# Patient Record
Sex: FEMALE | Race: WHITE
[De-identification: names, ages, dates, MRNs, and addresses within clinical notes are randomized per-mention and may not be internally consistent; named-entity substitution may affect disease eponyms.]

---

## 2018-10-24 ENCOUNTER — HOSPITAL ENCOUNTER (EMERGENCY)
Dept: HOSPITAL 92 - ERS | Age: 53
Discharge: HOME | End: 2018-10-24
Payer: MEDICARE
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
## 2018-10-24 DIAGNOSIS — F41.9: ICD-10-CM

## 2018-10-24 DIAGNOSIS — E03.9: ICD-10-CM

## 2018-10-24 DIAGNOSIS — S80.11XA: Primary | ICD-10-CM

## 2018-10-24 PROCEDURE — 96372 THER/PROPH/DIAG INJ SC/IM: CPT

## 2018-10-24 NOTE — RAD
THREE VIEWS RIGHT ANKLE:

 

Date: 10-24-18 

 

History: Right ankle pain/leg pain. Patient had 250 lbs. box fall over on right ankle. 

 

FINDINGS: 

The ankle mortise is congruent. There is no evidence of a fracture, dislocation, or other osseous abn
ormality involving the right ankle. Round osseous density is seen overlying the region of the anterio
r calcaneus in the region of the navicular bone, probably related to an accessory center of ossificat
ion. 

 

IMPRESSION: 

No acute osseous abnormality. 

 

POS: [facility]

## 2018-10-24 NOTE — RAD
TWO VIEWS RIGHT TIBIA AND FIBULA:

 

Date: 10-24-18 

 

History: Right leg pain. A 250 lbs box fell over on patient's right lower extremity. 

 

FINDINGS: 

There is no evidence of fracture, dislocation, or other osseous abnormality involving the right tibia
/fibula. 

 

IMPRESSION: 

No acute osseous abnormality. 

 

POS: [facility]

## 2020-04-04 ENCOUNTER — HOSPITAL ENCOUNTER (EMERGENCY)
Dept: HOSPITAL 92 - ERS | Age: 55
Discharge: HOME | End: 2020-04-04
Payer: COMMERCIAL

## 2020-04-04 DIAGNOSIS — J06.9: Primary | ICD-10-CM

## 2020-04-04 DIAGNOSIS — E03.9: ICD-10-CM

## 2020-04-04 DIAGNOSIS — F41.9: ICD-10-CM

## 2020-04-04 PROCEDURE — 99283 EMERGENCY DEPT VISIT LOW MDM: CPT

## 2020-04-21 ENCOUNTER — HOSPITAL ENCOUNTER (INPATIENT)
Dept: HOSPITAL 92 - ERS | Age: 55
LOS: 9 days | Discharge: HOME | DRG: 330 | End: 2020-04-30
Attending: SURGERY | Admitting: SURGERY
Payer: MEDICARE

## 2020-04-21 VITALS — BODY MASS INDEX: 25.4 KG/M2

## 2020-04-21 DIAGNOSIS — K31.1: ICD-10-CM

## 2020-04-21 DIAGNOSIS — E83.39: ICD-10-CM

## 2020-04-21 DIAGNOSIS — E03.9: ICD-10-CM

## 2020-04-21 DIAGNOSIS — K90.0: ICD-10-CM

## 2020-04-21 DIAGNOSIS — K26.5: Primary | ICD-10-CM

## 2020-04-21 DIAGNOSIS — E87.6: ICD-10-CM

## 2020-04-21 DIAGNOSIS — F41.9: ICD-10-CM

## 2020-04-21 DIAGNOSIS — R09.89: ICD-10-CM

## 2020-04-21 DIAGNOSIS — F32.9: ICD-10-CM

## 2020-04-21 DIAGNOSIS — Z79.890: ICD-10-CM

## 2020-04-21 DIAGNOSIS — E83.42: ICD-10-CM

## 2020-04-21 DIAGNOSIS — Z20.828: ICD-10-CM

## 2020-04-21 DIAGNOSIS — D62: ICD-10-CM

## 2020-04-21 DIAGNOSIS — Z79.899: ICD-10-CM

## 2020-04-21 LAB
ALBUMIN SERPL BCG-MCNC: 4.5 G/DL (ref 3.5–5)
ALP SERPL-CCNC: 110 U/L (ref 40–110)
ALT SERPL W P-5'-P-CCNC: 10 U/L (ref 8–55)
ANION GAP SERPL CALC-SCNC: 18 MMOL/L (ref 10–20)
AST SERPL-CCNC: 16 U/L (ref 5–34)
BASOPHILS # BLD AUTO: 0.1 THOU/UL (ref 0–0.2)
BASOPHILS NFR BLD AUTO: 0.5 % (ref 0–1)
BILIRUB SERPL-MCNC: 0.5 MG/DL (ref 0.2–1.2)
BUN SERPL-MCNC: 16 MG/DL (ref 9.8–20.1)
CALCIUM SERPL-MCNC: 10.2 MG/DL (ref 7.8–10.44)
CHLORIDE SERPL-SCNC: 92 MMOL/L (ref 98–107)
CO2 SERPL-SCNC: 33 MMOL/L (ref 22–29)
CREAT CL PREDICTED SERPL C-G-VRATE: 0 ML/MIN (ref 70–130)
EOSINOPHIL # BLD AUTO: 0 THOU/UL (ref 0–0.7)
EOSINOPHIL NFR BLD AUTO: 0.5 % (ref 0–10)
GLOBULIN SER CALC-MCNC: 3.8 G/DL (ref 2.4–3.5)
GLUCOSE SERPL-MCNC: 141 MG/DL (ref 70–105)
HGB BLD-MCNC: 15.3 G/DL (ref 12–16)
LYMPHOCYTES # BLD: 1.7 THOU/UL (ref 1.2–3.4)
LYMPHOCYTES NFR BLD AUTO: 16.1 % (ref 21–51)
MCH RBC QN AUTO: 29.2 PG (ref 27–31)
MCV RBC AUTO: 92.6 FL (ref 78–98)
MONOCYTES # BLD AUTO: 0.6 THOU/UL (ref 0.11–0.59)
MONOCYTES NFR BLD AUTO: 5.8 % (ref 0–10)
NEUTROPHILS # BLD AUTO: 8.3 THOU/UL (ref 1.4–6.5)
NEUTROPHILS NFR BLD AUTO: 77.1 % (ref 42–75)
PLATELET # BLD AUTO: 349 THOU/UL (ref 130–400)
POTASSIUM SERPL-SCNC: 3.2 MMOL/L (ref 3.5–5.1)
PREGS CONTROL BACKGROUND?: (no result)
PREGS CONTROL BAR APPEAR?: YES
PROT UR STRIP.AUTO-MCNC: 300 MG/DL
RBC # BLD AUTO: 5.23 MILL/UL (ref 4.2–5.4)
RBC UR QL AUTO: (no result) HPF (ref 0–3)
SODIUM SERPL-SCNC: 140 MMOL/L (ref 136–145)
WBC # BLD AUTO: 10.8 THOU/UL (ref 4.8–10.8)
WBC UR QL AUTO: (no result) HPF (ref 0–3)

## 2020-04-21 PROCEDURE — 96361 HYDRATE IV INFUSION ADD-ON: CPT

## 2020-04-21 PROCEDURE — 36415 COLL VENOUS BLD VENIPUNCTURE: CPT

## 2020-04-21 PROCEDURE — 84100 ASSAY OF PHOSPHORUS: CPT

## 2020-04-21 PROCEDURE — 85007 BL SMEAR W/DIFF WBC COUNT: CPT

## 2020-04-21 PROCEDURE — 74240 X-RAY XM UPR GI TRC 1CNTRST: CPT

## 2020-04-21 PROCEDURE — 83605 ASSAY OF LACTIC ACID: CPT

## 2020-04-21 PROCEDURE — 81003 URINALYSIS AUTO W/O SCOPE: CPT

## 2020-04-21 PROCEDURE — 0DB90ZX EXCISION OF DUODENUM, OPEN APPROACH, DIAGNOSTIC: ICD-10-PCS | Performed by: SURGERY

## 2020-04-21 PROCEDURE — 83735 ASSAY OF MAGNESIUM: CPT

## 2020-04-21 PROCEDURE — 88312 SPECIAL STAINS GROUP 1: CPT

## 2020-04-21 PROCEDURE — 96367 TX/PROPH/DG ADDL SEQ IV INF: CPT

## 2020-04-21 PROCEDURE — 85027 COMPLETE CBC AUTOMATED: CPT

## 2020-04-21 PROCEDURE — 84703 CHORIONIC GONADOTROPIN ASSAY: CPT

## 2020-04-21 PROCEDURE — 96375 TX/PRO/DX INJ NEW DRUG ADDON: CPT

## 2020-04-21 PROCEDURE — 80048 BASIC METABOLIC PNL TOTAL CA: CPT

## 2020-04-21 PROCEDURE — 88305 TISSUE EXAM BY PATHOLOGIST: CPT

## 2020-04-21 PROCEDURE — 96376 TX/PRO/DX INJ SAME DRUG ADON: CPT

## 2020-04-21 PROCEDURE — 87040 BLOOD CULTURE FOR BACTERIA: CPT

## 2020-04-21 PROCEDURE — 96365 THER/PROPH/DIAG IV INF INIT: CPT

## 2020-04-21 PROCEDURE — 74177 CT ABD & PELVIS W/CONTRAST: CPT

## 2020-04-21 PROCEDURE — 81015 MICROSCOPIC EXAM OF URINE: CPT

## 2020-04-21 PROCEDURE — 71045 X-RAY EXAM CHEST 1 VIEW: CPT

## 2020-04-21 PROCEDURE — 51701 INSERT BLADDER CATHETER: CPT

## 2020-04-21 PROCEDURE — 87635 SARS-COV-2 COVID-19 AMP PRB: CPT

## 2020-04-21 PROCEDURE — 93005 ELECTROCARDIOGRAM TRACING: CPT

## 2020-04-21 PROCEDURE — 0DU907Z SUPPLEMENT DUODENUM WITH AUTOLOGOUS TISSUE SUBSTITUTE, OPEN APPROACH: ICD-10-PCS | Performed by: SURGERY

## 2020-04-21 PROCEDURE — 94640 AIRWAY INHALATION TREATMENT: CPT

## 2020-04-21 PROCEDURE — 87086 URINE CULTURE/COLONY COUNT: CPT

## 2020-04-21 PROCEDURE — 8E0ZXY6 ISOLATION: ICD-10-PCS | Performed by: SURGERY

## 2020-04-21 PROCEDURE — C9113 INJ PANTOPRAZOLE SODIUM, VIA: HCPCS

## 2020-04-21 PROCEDURE — U0002 COVID-19 LAB TEST NON-CDC: HCPCS

## 2020-04-21 PROCEDURE — 80053 COMPREHEN METABOLIC PANEL: CPT

## 2020-04-21 PROCEDURE — 85025 COMPLETE CBC W/AUTO DIFF WBC: CPT

## 2020-04-21 NOTE — OP
DATE OF PROCEDURE:  04/21/2020



PREOPERATIVE DIAGNOSIS:  Perforated viscus.



POSTOPERATIVE DIAGNOSIS:  Perforated duodenal ulcer.



PROCEDURES PERFORMED:  Exploratory laparotomy, abdominal washout, closure and

omental patch repair of perforated duodenal ulcer. 



ANESTHESIA:  General.



ESTIMATED BLOOD LOSS:  50 mL.



COMPLICATIONS:  None.



FINDINGS:  First portion anterior perforation duodenal ulcer just past the pylorus.

Biopsy taken. 



TECHNIQUE:  The patient was taken to the operating room and laid supine on the

operating room table.  After general anesthetic was obtained, a Simms was placed.

The abdomen was prepped and draped in a sterile fashion.  A midline incision was

made in the upper midline.  Cautery dissected down to and into the abdominal cavity.

 Bookwalter retractor was placed.  Significant amount of intraabdominal

contamination was found.  There was a first portion of duodenal ulcer.  There was a

lot of bile in the upper abdomen.  This was all irrigated out.  Biopsy was performed

of the ulcer wall and then it was closed using PDS transversely.  NG tube was felt

to be in good position in the mid body of stomach.  A tongue of omentum was brought

up and laid over the top of the repair and sewn around using silk pop-off sutures.

This completely covers the perforation closure.  The abdomen had been irrigated

copiously using multiple liters of 

warm sterile saline until returns were clear.  All instrument counts, needle counts,

lap counts were correct.  #1 PDS was used to close the fascia from the top and

bottom and tied in the middle.  Subcutaneous tissues were irrigated and closed using

skin staples.  Telfa helen were placed in between the staples.  The patient is en

route to Recovery in stable condition.  All instrument counts, needle counts, and

lap counts were correct. 





Job ID:  [id number]

## 2020-04-21 NOTE — RAD
PORTABLE CHEST ONE VIEW:

4/21/20 at 4:39 p.m.

 

HISTORY:  

Vomiting, abdominal pain.

 

FINDINGS: 

The heart size is normal. The lungs are expanded without lobar consolidation,  pneumothoraces or pleu
ral effusions. There are postop changes with metallic hardware in the lower cervical spine. 

 

IMPRESSION: 

No acute process. 

 

POS: [facility]

## 2020-04-21 NOTE — HP
CHIEF COMPLAINT:  Abdominal pain.



HISTORY OF PRESENT ILLNESS:  This is a 54-year-old female, who presents with 
severe

upper abdominal pain, that has been building over the last 48 hours, seen in

emergency department where a CT scan reveals free intraperitoneal air.  She has 
no

known history of peptic ulcer or diverticulitis.  There is inflammatory change

around the duodenum on her CT scan, but she also has diverticula around her 
sigmoid

colon.  She had upper endoscopy by Dr. Martin in 2019, but she denies known 
history of

ulcer.  She was just recently diagnosed with celiac.  Her pain is described as

sharp, does not stop, goes straight to her back.  Denies history of heart 
disease.

She sees Dr. 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

vessels, but he has elected to treat it without intervention. 



PAST MEDICAL HISTORY:  Above.



PAST SURGICAL HISTORY:  Supraumbilical hernia repair.



MEDICATINOS:  Medicines taken daily estradiol.



SOCIAL HISTORY:  No smoking, alcohol, or other drugs.  No NSAID use or abuse.



REVIEW OF SYSTEMS:  Ten-system review of systems is otherwise negative except as

described above. 



PHYSICAL EXAMINATION:

VITAL SIGNS:  Pulse is 83, blood pressure is 130/75, respirations are 12, she is

afebrile. 

HEENT:  Sclerae anicteric.  Oropharynx clear. 

NECK:  No lymphadenopathy. 

CHEST:  Clear. 

HEART:  Regular rate. 

ABDOMEN:  Soft with diffuse peritoneal signs.  Well-healed incision above the

umbilicus. 

EXTREMITIES:  No ischemia or edema to extremities.



LABORATORY DATA:  White blood cell count is normal.  Electrolytes and creatinine

normal. 



IMAGING STUDIES:  CT scan as above.



ASSESSMENT:  

1. Perforated viscus with moderate free air, needs urgent laparotomy.

2. Upper respiratory symptoms 2 weeks ago.  They did order a COVID test today, 
but

she is not suspected of having the disease. 



PLAN:  To the operating room for exploration.



45 minutes spent in direct counseling, exam, reviewing films and discussion.



Job ID:  [id number]



[facility]

## 2020-04-21 NOTE — CT
CT OF THE ABDOMEN AND PELVIS WITH CONTRAST:

4/21/20

 

COMPARISON: 

3/2/17. 

 

HISTORY:  

History of celiac disease. Abdominal pain. Patient has been sick for three weeks. 

 

TECHNIQUE:  

Multiple contiguous axial images were obtained in a CT of the abdomen and pelvis with contrast. Sagit
osmin and coronal reformats were performed.

 

FINDINGS: 

There is a small to moderate amount of free air in the anterior aspect of the peritoneal cavity. Free
 fluid is seen in the pelvis and in the right upper quadrant of the abdomen. There is severe thickeni
ng of the wall of the pyloric region of the stomach and proximal duodenum just passed the pylorus. He
 small bowel is normal in caliber without significant distention. There are a few scattered diverticu
la in the colon. No stranding changes are seen surrounding the colon. The appendix is normal. The griffin
er, gallbladder, kidneys, adrenal glands, spleen, and pancreas are unremarkable. 

 

The patient is status post hysterectomy. No abdominal or pelvic lymphadenopathy are seen. Atheroscler
otic calcifications are seen in the aorta. 

 

The visualized inferior thorax and abdominal wall soft tissues are unremarkable. Mild degenerative ch
anges are seen in the spine.

 

IMPRESSION: 

There is free air in the abdomen likely secondary to perforated viscus.  The area of perforation is u
ncertain. However, there is significant thickening in the region of the pylorus and a perforated nadine
lior/duodenal ulcer may be a cause for this free air. Alternatively, there are a few scattered diverti
cula in the colon and perforated acute diverticulitis is also a possibility. 

 

POS: [facility]
Transferred

## 2020-04-22 LAB
ANION GAP SERPL CALC-SCNC: 14 MMOL/L (ref 10–20)
BUN SERPL-MCNC: 16 MG/DL (ref 9.8–20.1)
CALCIUM SERPL-MCNC: 8.3 MG/DL (ref 7.8–10.44)
CHLORIDE SERPL-SCNC: 105 MMOL/L (ref 98–107)
CO2 SERPL-SCNC: 24 MMOL/L (ref 22–29)
CREAT CL PREDICTED SERPL C-G-VRATE: 100 ML/MIN (ref 70–130)
GLUCOSE SERPL-MCNC: 167 MG/DL (ref 70–105)
HGB BLD-MCNC: 13.7 G/DL (ref 12–16)
MCH RBC QN AUTO: 29.2 PG (ref 27–31)
MCV RBC AUTO: 94.9 FL (ref 78–98)
MDIFF COMPLETE?: YES
PLATELET # BLD AUTO: 324 THOU/UL (ref 130–400)
POTASSIUM SERPL-SCNC: 3.9 MMOL/L (ref 3.5–5.1)
RBC # BLD AUTO: 4.68 MILL/UL (ref 4.2–5.4)
SODIUM SERPL-SCNC: 139 MMOL/L (ref 136–145)
WBC # BLD AUTO: 15.9 THOU/UL (ref 4.8–10.8)

## 2020-04-22 RX ADMIN — METRONIDAZOLE SCH MLS: 500 INJECTION, SOLUTION INTRAVENOUS at 20:15

## 2020-04-22 RX ADMIN — ONDANSETRON PRN MG: 2 INJECTION INTRAMUSCULAR; INTRAVENOUS at 18:48

## 2020-04-22 RX ADMIN — METRONIDAZOLE SCH MLS: 500 INJECTION, SOLUTION INTRAVENOUS at 03:06

## 2020-04-22 RX ADMIN — METRONIDAZOLE SCH MLS: 500 INJECTION, SOLUTION INTRAVENOUS at 12:35

## 2020-04-22 RX ADMIN — POTASSIUM CHLORIDE, DEXTROSE MONOHYDRATE AND SODIUM CHLORIDE SCH MLS: 150; 5; 450 INJECTION, SOLUTION INTRAVENOUS at 00:03

## 2020-04-22 RX ADMIN — POTASSIUM CHLORIDE, DEXTROSE MONOHYDRATE AND SODIUM CHLORIDE SCH MLS: 150; 5; 450 INJECTION, SOLUTION INTRAVENOUS at 08:26

## 2020-04-22 RX ADMIN — POTASSIUM CHLORIDE, DEXTROSE MONOHYDRATE AND SODIUM CHLORIDE SCH MLS: 150; 5; 450 INJECTION, SOLUTION INTRAVENOUS at 17:08

## 2020-04-22 RX ADMIN — ONDANSETRON PRN MG: 2 INJECTION INTRAMUSCULAR; INTRAVENOUS at 09:10

## 2020-04-22 NOTE — PDOC.GSPN
Surgery Progress Note: Subj





- Subjective


Narrative: 





Pain controlled.  No nausea





Surgery Progress Note: Obj





- Vital signs


Vital signs: 


 Vital Signs - Most Recent











Temp Pulse Resp BP Pulse Ox


 


 98.3 F   83   18   125/83   96 


 


 04/22/20 03:30  04/22/20 03:30  04/22/20 03:30  04/22/20 03:30  04/22/20 04:00














- Physical Exam


General: no distress


Cardiovascular: regular rate and rhythm


Respiratory: clear to auscultation


Abdomen: soft, appropriately tender


Wound: dressing clean,dry,intact





Surgery Progress Note: Results





- Labs


Result Diagrams: 


 04/22/20 05:07





 04/22/20 05:07


Lab results: 


 Laboratory Results - last 24 hr











  04/22/20 04/22/20





  05:07 05:07


 


WBC   15.9 H


 


RBC   4.68


 


Hgb   13.7


 


Hct   44.4


 


MCV   94.9


 


MCH   29.2


 


MCHC   30.8 L


 


RDW   14.3


 


Plt Count   324


 


MPV   7.4


 


Neutrophils % (Manual)   72


 


Band Neuts % (Manual)   21 H


 


Lymphocytes % (Manual)   4 L


 


Monocytes % (Manual)   3


 


Plt Morphology Comment   Appears Adequate


 


Sodium  139 


 


Potassium  3.9 


 


Chloride  105 


 


Carbon Dioxide  24 


 


Anion Gap  14 


 


BUN  16 


 


Creatinine  0.71 


 


Estimated GFR (MDRD)  85 


 


Glucose  167 H 


 


Calcium  8.3 














Surgery Progress Note: A/P





- Problem


(1) Perforated duodenal ulcer


Current Visit: Yes   Code(s): K26.5 - CHRONIC OR UNSPECIFIED DUODENAL ULCER 

WITH PERFORATION   Status: Acute   





- Plan


Plan: 





POD 1 omental patch repair


-tomorrow, contrast study through NG


-ambulate around room

## 2020-04-23 LAB
ANION GAP SERPL CALC-SCNC: 14 MMOL/L (ref 10–20)
BASOPHILS # BLD AUTO: 0 THOU/UL (ref 0–0.2)
BASOPHILS NFR BLD AUTO: 0.1 % (ref 0–1)
BUN SERPL-MCNC: 16 MG/DL (ref 9.8–20.1)
CALCIUM SERPL-MCNC: 8.7 MG/DL (ref 7.8–10.44)
CHLORIDE SERPL-SCNC: 102 MMOL/L (ref 98–107)
CO2 SERPL-SCNC: 26 MMOL/L (ref 22–29)
CREAT CL PREDICTED SERPL C-G-VRATE: 99 ML/MIN (ref 70–130)
EOSINOPHIL # BLD AUTO: 0 THOU/UL (ref 0–0.7)
EOSINOPHIL NFR BLD AUTO: 0.3 % (ref 0–10)
GLUCOSE SERPL-MCNC: 90 MG/DL (ref 70–105)
HGB BLD-MCNC: 13.1 G/DL (ref 12–16)
LYMPHOCYTES # BLD: 1.5 THOU/UL (ref 1.2–3.4)
LYMPHOCYTES NFR BLD AUTO: 10.9 % (ref 21–51)
MCH RBC QN AUTO: 30.4 PG (ref 27–31)
MCV RBC AUTO: 94.9 FL (ref 78–98)
MONOCYTES # BLD AUTO: 0.8 THOU/UL (ref 0.11–0.59)
MONOCYTES NFR BLD AUTO: 5.7 % (ref 0–10)
NEUTROPHILS # BLD AUTO: 11.4 THOU/UL (ref 1.4–6.5)
NEUTROPHILS NFR BLD AUTO: 83 % (ref 42–75)
PLATELET # BLD AUTO: 250 THOU/UL (ref 130–400)
POTASSIUM SERPL-SCNC: 3.6 MMOL/L (ref 3.5–5.1)
RBC # BLD AUTO: 4.31 MILL/UL (ref 4.2–5.4)
SODIUM SERPL-SCNC: 138 MMOL/L (ref 136–145)
WBC # BLD AUTO: 13.7 THOU/UL (ref 4.8–10.8)

## 2020-04-23 RX ADMIN — ONDANSETRON PRN MG: 2 INJECTION INTRAMUSCULAR; INTRAVENOUS at 08:19

## 2020-04-23 RX ADMIN — POTASSIUM CHLORIDE, DEXTROSE MONOHYDRATE AND SODIUM CHLORIDE SCH MLS: 150; 5; 450 INJECTION, SOLUTION INTRAVENOUS at 15:58

## 2020-04-23 RX ADMIN — POTASSIUM CHLORIDE, DEXTROSE MONOHYDRATE AND SODIUM CHLORIDE SCH MLS: 150; 5; 450 INJECTION, SOLUTION INTRAVENOUS at 08:17

## 2020-04-23 RX ADMIN — METRONIDAZOLE SCH MLS: 500 INJECTION, SOLUTION INTRAVENOUS at 03:40

## 2020-04-23 RX ADMIN — POTASSIUM CHLORIDE, DEXTROSE MONOHYDRATE AND SODIUM CHLORIDE SCH MLS: 150; 5; 450 INJECTION, SOLUTION INTRAVENOUS at 04:43

## 2020-04-23 RX ADMIN — ONDANSETRON PRN MG: 2 INJECTION INTRAMUSCULAR; INTRAVENOUS at 00:09

## 2020-04-23 RX ADMIN — METRONIDAZOLE SCH MLS: 500 INJECTION, SOLUTION INTRAVENOUS at 20:44

## 2020-04-23 RX ADMIN — METRONIDAZOLE SCH MLS: 500 INJECTION, SOLUTION INTRAVENOUS at 12:00

## 2020-04-23 NOTE — RAD
Exam: Gastrografin upper GI



HISTORY: Evaluate for leak after perforated ulcer repair



COMPARISON: none



FINDINGS:

Supine  radiograph demonstrates a nasogastric tube in the left upper quadrant. Surgical clips ar
e noted in the midline of the abdomen.

Prior to the exam, fluoroscopy demonstrates air underneath the right hemidiaphragm, compatible with i
atrogenic pneumoperitoneum.

Patient was administered a total of 35 cc of Gastrografin. Gastrografin opacifies the stomach. There 
is nonexistent gastric peristalsis. There is no evidence of contrast flowing into the duodenum.

Findings are likely due to a gastric ileus secondary to recent surgery. No leak or extravasation

Postprocedure supine and upright abdomen radiograph demonstrates contrast contrast contained within t
he stomach. No leak or extravasation.



IMPRESSION:

1. No leak or extravasation.

2. Iatrogenic pneumoperitoneum demonstrated in the right hemidiaphragm.



Reported By: Nickolas Lazcano 

Electronically Signed:  4/23/2020 9:40 AM

## 2020-04-23 NOTE — PRG
DATE OF SERVICE:  04/23/2020



SUBJECTIVE:  Ms. Remy is postop day 2, perforated duodenal ulcer repair.  Ms. Remy is complaining of pain.  She notes that when she is off the NG suction, her

bloating worsens.  She is having some nausea.  She is afebrile.  Her vital signs are

stable.  NG output is 250 overnight.  Her abdomen is soft.  Her helen were removed

from the incision and new dressings were placed.  There was no evidence of

infection. 



LABORATORY DATA:  White blood cell count is 13, hemoglobin 13.  Sodium 138,

potassium 3.6, creatinine is normal.  COVID-19 PCR, negative. 



ASSESSMENT:  Postoperative day 2, omental patch duodenal ulcer repair.



PLAN:  Gastrografin study today, then can likely pull the NG tube and start on clear

liquid diet. 







Job ID:  [id number]

## 2020-04-24 RX ADMIN — ONDANSETRON PRN MG: 2 INJECTION INTRAMUSCULAR; INTRAVENOUS at 19:58

## 2020-04-24 RX ADMIN — METRONIDAZOLE SCH MLS: 500 INJECTION, SOLUTION INTRAVENOUS at 03:28

## 2020-04-24 RX ADMIN — POTASSIUM CHLORIDE, DEXTROSE MONOHYDRATE AND SODIUM CHLORIDE SCH MLS: 150; 5; 450 INJECTION, SOLUTION INTRAVENOUS at 03:29

## 2020-04-24 RX ADMIN — METRONIDAZOLE SCH MLS: 500 INJECTION, SOLUTION INTRAVENOUS at 12:05

## 2020-04-24 RX ADMIN — POTASSIUM CHLORIDE, DEXTROSE MONOHYDRATE AND SODIUM CHLORIDE SCH MLS: 150; 5; 450 INJECTION, SOLUTION INTRAVENOUS at 15:13

## 2020-04-24 RX ADMIN — ONDANSETRON PRN MG: 2 INJECTION INTRAMUSCULAR; INTRAVENOUS at 00:29

## 2020-04-24 RX ADMIN — POTASSIUM CHLORIDE, DEXTROSE MONOHYDRATE AND SODIUM CHLORIDE SCH: 150; 5; 450 INJECTION, SOLUTION INTRAVENOUS at 07:59

## 2020-04-24 RX ADMIN — METRONIDAZOLE SCH MLS: 500 INJECTION, SOLUTION INTRAVENOUS at 20:05

## 2020-04-24 RX ADMIN — POTASSIUM CHLORIDE, DEXTROSE MONOHYDRATE AND SODIUM CHLORIDE SCH MLS: 150; 5; 450 INJECTION, SOLUTION INTRAVENOUS at 23:22

## 2020-04-24 RX ADMIN — ONDANSETRON PRN MG: 2 INJECTION INTRAMUSCULAR; INTRAVENOUS at 08:53

## 2020-04-24 NOTE — EKG
Test Reason : 

Blood Pressure : ***/*** mmHG

Vent. Rate : 052 BPM     Atrial Rate : 052 BPM

   P-R Int : 110 ms          QRS Dur : 084 ms

    QT Int : 478 ms       P-R-T Axes : 010 -21 057 degrees

   QTc Int : 444 ms

 

Sinus bradycardia with short DE

Inferior infarct , age undetermined

Abnormal ECG

 

Confirmed by LANNY CLARK ([id number]),  YAMILETH OLSEN ([id number]) on 4/24/2020 2:07:56 PM

 

Referred By:             Confirmed By:LANNY CLARK

## 2020-04-24 NOTE — PRG
DATE OF SERVICE:  04/24/2020



SUBJECTIVE:  I am covering for Dr. Quintero, seen Ms. Remy, who is a 54-year-old

woman, postoperative day #3, status post exploratory laparotomy, abdominal washout,

and closure of perforated duodenal ulcer with Enzo patch. 



The patient is awake and alert this morning. 



She reports 7/10 abdominal pain, which is slightly worse than yesterday when her

pain was rated at 5/10.  She denies any nausea or vomiting. 



She denies passing any flatus. 



Gastrografin upper GI study was undertaken yesterday, which revealed no leak. 



The patient was started on a clear liquid diet yesterday. 



This morning, she reports no fevers or chills.



OBJECTIVE:  VITAL SIGNS:  Include blood pressure is 126/79, pulse is 73, respiratory

rate is 14, temperature is 100.0 degrees Fahrenheit, and oxygen saturation is 100%

on room air. 

ABDOMEN:  Soft and nondistended. 



Bowel sounds are hypoactive. 



She has moderate tenderness to palpation with no peritoneal signs on examination.



IMPRESSION:  

1. Postoperative day #3, status post exploratory laparotomy, closure of perforated

duodenal ulcer with Enzo patch. 

2. Residual abdominal pain with no clinical evidence of peritonitis.



PLAN:  

1. We will resume oral analgesics.

2. We will increase the Protonix to 40 mg IV b.i.d.

3. Continue with clear liquid diet at this time and serial physical examination. 



Above findings and plan discussed with the patient, who indicates understanding of

information given. 



I have answered her questions.







Job ID:  [id number]

## 2020-04-25 LAB
ANION GAP SERPL CALC-SCNC: 8 MMOL/L (ref 10–20)
BASOPHILS # BLD AUTO: 0 THOU/UL (ref 0–0.2)
BASOPHILS NFR BLD AUTO: 0.6 % (ref 0–1)
BUN SERPL-MCNC: 10 MG/DL (ref 9.8–20.1)
CALCIUM SERPL-MCNC: 8.5 MG/DL (ref 7.8–10.44)
CHLORIDE SERPL-SCNC: 103 MMOL/L (ref 98–107)
CO2 SERPL-SCNC: 33 MMOL/L (ref 22–29)
CREAT CL PREDICTED SERPL C-G-VRATE: 103 ML/MIN (ref 70–130)
EOSINOPHIL # BLD AUTO: 0.2 THOU/UL (ref 0–0.7)
EOSINOPHIL NFR BLD AUTO: 3.3 % (ref 0–10)
GLUCOSE SERPL-MCNC: 127 MG/DL (ref 70–105)
HGB BLD-MCNC: 10.4 G/DL (ref 12–16)
LYMPHOCYTES # BLD: 0.6 THOU/UL (ref 1.2–3.4)
LYMPHOCYTES NFR BLD AUTO: 11 % (ref 21–51)
MAGNESIUM SERPL-MCNC: 1.9 MG/DL (ref 1.6–2.6)
MCH RBC QN AUTO: 29.6 PG (ref 27–31)
MCV RBC AUTO: 95.2 FL (ref 78–98)
MONOCYTES # BLD AUTO: 0.3 THOU/UL (ref 0.11–0.59)
MONOCYTES NFR BLD AUTO: 4.7 % (ref 0–10)
NEUTROPHILS # BLD AUTO: 4.7 THOU/UL (ref 1.4–6.5)
NEUTROPHILS NFR BLD AUTO: 80.5 % (ref 42–75)
PLATELET # BLD AUTO: 239 THOU/UL (ref 130–400)
POTASSIUM SERPL-SCNC: 3.7 MMOL/L (ref 3.5–5.1)
RBC # BLD AUTO: 3.51 MILL/UL (ref 4.2–5.4)
SODIUM SERPL-SCNC: 140 MMOL/L (ref 136–145)
WBC # BLD AUTO: 5.8 THOU/UL (ref 4.8–10.8)

## 2020-04-25 RX ADMIN — POTASSIUM CHLORIDE, DEXTROSE MONOHYDRATE AND SODIUM CHLORIDE SCH MLS: 150; 5; 450 INJECTION, SOLUTION INTRAVENOUS at 11:52

## 2020-04-25 RX ADMIN — ONDANSETRON PRN MG: 2 INJECTION INTRAMUSCULAR; INTRAVENOUS at 10:31

## 2020-04-25 RX ADMIN — METRONIDAZOLE SCH MLS: 500 INJECTION, SOLUTION INTRAVENOUS at 20:11

## 2020-04-25 RX ADMIN — METRONIDAZOLE SCH MLS: 500 INJECTION, SOLUTION INTRAVENOUS at 04:56

## 2020-04-25 RX ADMIN — POTASSIUM CHLORIDE, DEXTROSE MONOHYDRATE AND SODIUM CHLORIDE SCH MLS: 150; 5; 450 INJECTION, SOLUTION INTRAVENOUS at 21:06

## 2020-04-25 RX ADMIN — METRONIDAZOLE SCH MLS: 500 INJECTION, SOLUTION INTRAVENOUS at 12:28

## 2020-04-25 NOTE — PRG
DATE OF SERVICE:  04/24/2020



SUBJECTIVE:  The patient was seen this evening during evening rounds on the 
medical

floor.  The patient is postop day #3, status post exploratory laparotomy, 
abdominal

washout and closure of perforated duodenal ulcer with Enzo patch.  The 
patient is

currently sitting on the side of the bed with moderate abdominal pain.  The 
patient

denies any nausea or vomiting.  The patient has not passed any flatus or had a 
bowel

movement.  The patient states she has been walking frequently and sitting up in 
the

chair most of the day.  The patient is tolerating a clear liquid diet. 



OBJECTIVE:  VITAL SIGNS:  Stable, afebrile. 

GENERAL:  Well-appearing middle aged female, in no acute distress.



IMPRESSION:  

1. Postop day #3, status post exploratory laparotomy, closure of perforated 
duodenal

ulcer with Enzo patch. 

2. Residual abdominal pain with no clinical evidence of peritonitis.



PLAN:  Continue oral pain medications.  Continue clear liquid diet as tolerated.

Continue to have the patient ambulate frequently.  The plan was discussed with 
the

patient who agrees. 







Job ID:  [id number]



MTDD

## 2020-04-25 NOTE — PRG
DATE OF SERVICE:  04/25/2020



SUBJECTIVE:  The patient was seen this morning during rounds.  She was standing up

at the edge of the bed with no signs of acute distress.  She reported some nausea

this morning.  Denies flatus and vomiting overnight or this morning.  Morphine PCA

was discontinued yesterday and she was started on scheduled p.o. pain medication

with Tylenol and tramadol. 



OBJECTIVE:  VITAL SIGNS:  Temperature 97.7, pulse 75, respirations 20, oxygen

saturation 94% on room air, blood pressure 109/70. 

GENERAL:  Well-appearing middle-aged female standing up at edge of bed with no signs

of acute distress. 

PULMONARY:  Equal chest rise and fall.  Clear breath sounds bilaterally.  No signs

of acute respiratory distress. 

CARDIAC:  Regular rate and rhythm.  No murmurs, gallops, or rubs. 

GI:  Abdomen is soft, nontender, nondistended. 

EXTREMITIES:  2+ pulses in all extremities.  Gross motor and sensation are intact.

No significant swelling noted. 

NEURO:  GCS is 15.



LABORATORY FINDINGS:  White count 5.8, hemoglobin 10.4, hematocrit 33.4, platelets

239.  Sodium 140, potassium 3.7, chloride 103, bicarb 33, BUN 10, creatinine 0.69,

glucose 127, phosphorus 1.9, magnesium 1.9. 



DIAGNOSTIC FINDINGS:  There are no new diagnostic findings to report.



ASSESSMENT:  

1. Postop day 4 status post exploratory laparotomy, abdominal washout, closure and

omental patch repair of perforated duodenal ulcer. 

2. Postoperative pain, persistent.

3. Acute hypophosphatemia, hypomagnesemia and hypokalemia.



PLAN:  Continue current clear liquid diet and pain regimen.  Continue antibiotics.

Continue IV fluids.  Continue ambulating as much as possible.  Replace phosphorus,

magnesium and potassium today via IV route.  We are pending return of bowel function

at this time.  This patient was seen and examined by Dr. Rosado and myself this

morning during rounds. 







Job ID:  [id number]

## 2020-04-26 LAB
ANION GAP SERPL CALC-SCNC: 10 MMOL/L (ref 10–20)
BUN SERPL-MCNC: 8 MG/DL (ref 9.8–20.1)
CALCIUM SERPL-MCNC: 8.1 MG/DL (ref 7.8–10.44)
CHLORIDE SERPL-SCNC: 106 MMOL/L (ref 98–107)
CO2 SERPL-SCNC: 27 MMOL/L (ref 22–29)
CREAT CL PREDICTED SERPL C-G-VRATE: 108 ML/MIN (ref 70–130)
GLUCOSE SERPL-MCNC: 112 MG/DL (ref 70–105)
HGB BLD-MCNC: 9.9 G/DL (ref 12–16)
MAGNESIUM SERPL-MCNC: 2.2 MG/DL (ref 1.6–2.6)
MCH RBC QN AUTO: 29 PG (ref 27–31)
MCV RBC AUTO: 94.5 FL (ref 78–98)
MDIFF COMPLETE?: YES
PLATELET # BLD AUTO: 264 THOU/UL (ref 130–400)
POTASSIUM SERPL-SCNC: 3.7 MMOL/L (ref 3.5–5.1)
RBC # BLD AUTO: 3.43 MILL/UL (ref 4.2–5.4)
SODIUM SERPL-SCNC: 139 MMOL/L (ref 136–145)
WBC # BLD AUTO: 3.9 THOU/UL (ref 4.8–10.8)

## 2020-04-26 RX ADMIN — ONDANSETRON PRN MG: 2 INJECTION INTRAMUSCULAR; INTRAVENOUS at 20:04

## 2020-04-26 RX ADMIN — METRONIDAZOLE SCH MLS: 500 INJECTION, SOLUTION INTRAVENOUS at 03:08

## 2020-04-26 RX ADMIN — METRONIDAZOLE SCH MLS: 500 INJECTION, SOLUTION INTRAVENOUS at 20:04

## 2020-04-26 RX ADMIN — POTASSIUM CHLORIDE, DEXTROSE MONOHYDRATE AND SODIUM CHLORIDE SCH: 150; 5; 450 INJECTION, SOLUTION INTRAVENOUS at 04:11

## 2020-04-26 RX ADMIN — METRONIDAZOLE SCH MLS: 500 INJECTION, SOLUTION INTRAVENOUS at 12:16

## 2020-04-26 NOTE — PRG
DATE OF SERVICE:  04/26/2020



SUBJECTIVE:  Ms. Remy is a 54-year-old woman, postop day #4, status post

exploratory laparotomy, abdominal washout and closure of perforated duodenal ulcer

with Enzo patch. 



The patient is awake and alert today, reports adequate pain control. 



She reports having had bowel movement and passing flatus. 



She is tolerating clear liquid diet.



OBJECTIVE:  VITAL SIGNS:  Her vital signs this morning includes blood pressure is

132/82, pulse is 68, respiratory rate is 17, temperature is 97.5 degrees Fahrenheit,

oxygen saturation is 94% on room air. 

ABDOMEN:  Soft.  Incision is intact, clean, dry with mild incisional tenderness to

palpation.  She clearly has no peritoneal signs on examination.  Bowel sounds are

present in all 4 quadrants. 



LABORATORY FINDINGS:  Today includes a CBC with 3900 white blood cells.  Hemoglobin

and hematocrit 9.9 and 32.4 respectively. 



Platelet count is 264,000. 



Metabolic profile; sodium is 139, potassium is 3.7, chloride is 106, bicarb is 27,

BUN is 8, creatinine is 0.66, glucose is 112, magnesium is 2.2, and phosphorus is

1.9. 



IMPRESSIONS:  

1. Postoperative day #4, status post exploratory laparotomy, closure of duodenal

ulcer perforation with Enzo patch. 

2. Acute __________.

3. Acute hypophosphatemia.

4. Stable acute blood loss anemia.



PLAN:  

1. Correct abnormal electrolytes.

2. The patient will be started on iron replacement therapy and vitamin C.

3. We will advance diet to full liquids.

4. Increase activity.

5. Anticipate discharge within next 24 hours, if the patient continues to tolerate

oral intake and show no sign of infection.  Above findings and plan discussed with

the patient, who indicates understanding of information given.  I have answered her

questions. 







Job ID:  [id number]

## 2020-04-27 LAB
ANION GAP SERPL CALC-SCNC: 9 MMOL/L (ref 10–20)
BASOPHILS # BLD AUTO: 0 THOU/UL (ref 0–0.2)
BASOPHILS NFR BLD AUTO: 1.2 % (ref 0–1)
BUN SERPL-MCNC: 8 MG/DL (ref 9.8–20.1)
CALCIUM SERPL-MCNC: 8.1 MG/DL (ref 7.8–10.44)
CHLORIDE SERPL-SCNC: 105 MMOL/L (ref 98–107)
CO2 SERPL-SCNC: 27 MMOL/L (ref 22–29)
CREAT CL PREDICTED SERPL C-G-VRATE: 101 ML/MIN (ref 70–130)
EOSINOPHIL # BLD AUTO: 0.3 THOU/UL (ref 0–0.7)
EOSINOPHIL NFR BLD AUTO: 8.9 % (ref 0–10)
GLUCOSE SERPL-MCNC: 92 MG/DL (ref 70–105)
HGB BLD-MCNC: 10.3 G/DL (ref 12–16)
LYMPHOCYTES # BLD: 1.3 THOU/UL (ref 1.2–3.4)
LYMPHOCYTES NFR BLD AUTO: 32.9 % (ref 21–51)
MAGNESIUM SERPL-MCNC: 2 MG/DL (ref 1.6–2.6)
MCH RBC QN AUTO: 29.5 PG (ref 27–31)
MCV RBC AUTO: 93.8 FL (ref 78–98)
MONOCYTES # BLD AUTO: 0.4 THOU/UL (ref 0.11–0.59)
MONOCYTES NFR BLD AUTO: 9.6 % (ref 0–10)
NEUTROPHILS # BLD AUTO: 1.8 THOU/UL (ref 1.4–6.5)
NEUTROPHILS NFR BLD AUTO: 47.4 % (ref 42–75)
PLATELET # BLD AUTO: 307 THOU/UL (ref 130–400)
POTASSIUM SERPL-SCNC: 4.3 MMOL/L (ref 3.5–5.1)
RBC # BLD AUTO: 3.5 MILL/UL (ref 4.2–5.4)
SODIUM SERPL-SCNC: 137 MMOL/L (ref 136–145)
WBC # BLD AUTO: 3.8 THOU/UL (ref 4.8–10.8)

## 2020-04-27 RX ADMIN — ONDANSETRON PRN MG: 2 INJECTION INTRAMUSCULAR; INTRAVENOUS at 19:46

## 2020-04-27 RX ADMIN — METRONIDAZOLE SCH MLS: 500 INJECTION, SOLUTION INTRAVENOUS at 12:25

## 2020-04-27 RX ADMIN — ONDANSETRON PRN MG: 2 INJECTION INTRAMUSCULAR; INTRAVENOUS at 09:39

## 2020-04-27 RX ADMIN — Medication SCH MG: at 09:33

## 2020-04-27 RX ADMIN — METRONIDAZOLE SCH MLS: 500 INJECTION, SOLUTION INTRAVENOUS at 03:06

## 2020-04-27 RX ADMIN — METRONIDAZOLE SCH MLS: 500 INJECTION, SOLUTION INTRAVENOUS at 19:47

## 2020-04-27 NOTE — PRG
DATE OF SERVICE:  04/27/2020



SUBJECTIVE:  Ms. Remy is doing well.  She still feels bloated at times with a

liquid diet.  She is ambulatory.  Her pain is controlled. 



OBJECTIVE:  VITAL SIGNS:  She is afebrile.  Her vital signs are stable. 

ABDOMEN:  Soft, minimally distended.  Her wound is healing well.  There is no

infection.  She has bowel sounds.  She has appropriate postop tenderness. 



LABORATORY DATA:  White blood cell count is 3.8, hemoglobin is 10, platelet count is

307.  Basic metabolic panel within normal limits. 



ASSESSMENT:  Postop omental patch repair for perforated duodenal ulcer.



PLAN:  Try full liquid diet today.  If she tolerates that, she could likely go home

tomorrow.  Her biopsy showed no malignancy and no evidence of H pylori infection. 







Job ID:  [id number]

## 2020-04-28 RX ADMIN — Medication SCH MG: at 08:26

## 2020-04-28 RX ADMIN — LEUCINE, PHENYLALANINE, LYSINE, METHIONINE, ISOLEUCINE, VALINE, HISTIDINE, THREONINE, TRYPTOPHAN, ALANINE, GLYCINE, ARGININE, PROLINE, SERINE, TYROSINE, SODIUM ACETATE, DIBASIC POTASSIUM PHOSPHATE, MAGNESIUM CHLORIDE, SODIUM CHLORIDE, CALCIUM CHLORIDE, DEXTROSE SCH MLS
311; 238; 247; 170; 255; 247; 204; 179; 77; 880; 438; 489; 289; 213; 17; 297; 261; 51; 77; 33; 5 INJECTION INTRAVENOUS at 21:31

## 2020-04-28 RX ADMIN — METRONIDAZOLE SCH MLS: 500 INJECTION, SOLUTION INTRAVENOUS at 03:54

## 2020-04-29 RX ADMIN — Medication SCH MG: at 07:45

## 2020-04-29 RX ADMIN — LEUCINE, PHENYLALANINE, LYSINE, METHIONINE, ISOLEUCINE, VALINE, HISTIDINE, THREONINE, TRYPTOPHAN, ALANINE, GLYCINE, ARGININE, PROLINE, SERINE, TYROSINE, SODIUM ACETATE, DIBASIC POTASSIUM PHOSPHATE, MAGNESIUM CHLORIDE, SODIUM CHLORIDE, CALCIUM CHLORIDE, DEXTROSE SCH MLS
311; 238; 247; 170; 255; 247; 204; 179; 77; 880; 438; 489; 289; 213; 17; 297; 261; 51; 77; 33; 5 INJECTION INTRAVENOUS at 07:48

## 2020-04-29 RX ADMIN — LEUCINE, PHENYLALANINE, LYSINE, METHIONINE, ISOLEUCINE, VALINE, HISTIDINE, THREONINE, TRYPTOPHAN, ALANINE, GLYCINE, ARGININE, PROLINE, SERINE, TYROSINE, SODIUM ACETATE, DIBASIC POTASSIUM PHOSPHATE, MAGNESIUM CHLORIDE, SODIUM CHLORIDE, CALCIUM CHLORIDE, DEXTROSE SCH MLS
311; 238; 247; 170; 255; 247; 204; 179; 77; 880; 438; 489; 289; 213; 17; 297; 261; 51; 77; 33; 5 INJECTION INTRAVENOUS at 18:40

## 2020-04-29 NOTE — PRG
DATE OF SERVICE:  04/29/2020



SUBJECTIVE:  Ms. Remy has less nausea today.  She does feel bloated with her

liquid diet.  She is not able to move on to full liquids, given her significant

allergies to food, but she has been afebrile.  Vital signs are stable.  Her abdomen

is soft.  Her wound is healing well. 



ASSESSMENT:  Postop omental patch repair of perforated duodenal ulcer.  Pathology,

benign.  On Protonix. 



PLAN:  I am going to advance her to GI soft diet.  I recommend that she do soups or

casserole type foods today.  If she tolerates that today, discharge home tomorrow.

I think she is going to have some component of gastric outlet obstruction, given

edema and swelling for a little bit longer. 







Job ID:  [id number]

## 2020-04-30 VITALS — TEMPERATURE: 97.5 F | DIASTOLIC BLOOD PRESSURE: 82 MMHG | SYSTOLIC BLOOD PRESSURE: 117 MMHG

## 2020-04-30 RX ADMIN — Medication SCH MG: at 08:22

## 2020-04-30 RX ADMIN — LEUCINE, PHENYLALANINE, LYSINE, METHIONINE, ISOLEUCINE, VALINE, HISTIDINE, THREONINE, TRYPTOPHAN, ALANINE, GLYCINE, ARGININE, PROLINE, SERINE, TYROSINE, SODIUM ACETATE, DIBASIC POTASSIUM PHOSPHATE, MAGNESIUM CHLORIDE, SODIUM CHLORIDE, CALCIUM CHLORIDE, DEXTROSE SCH MLS
311; 238; 247; 170; 255; 247; 204; 179; 77; 880; 438; 489; 289; 213; 17; 297; 261; 51; 77; 33; 5 INJECTION INTRAVENOUS at 04:01

## 2020-05-01 NOTE — PQF
KELLI DOLL BRYAN DAVID MD

[id number]                                                             [id number]
[id number]

[id number]                             

                                   

CLINICAL DOCUMENTATION CLARIFICATION FORM:  POST DISCHARGE



Addendum to original discharge summary date:  __________________________________
____



Late entry note date:  _________________________________________________________
__











DATE:5/1/2020                                                    ATTN: Yoel Bourne





Please exercise your independent, professional judgment in responding to the 
clarification form. 

Clinical indicators are provided on the bottom of this form for your review





Please check appropriate box(s) to clarify if the following diagnosis has been 
ruled in or ruled out:



Acute blood loss Anemia

[  ] Ruled in diagnosis

     [  ] Continue to treat        [  ] Resolved

[  ] Ruled out diagnosis

[ X ] Cannot rule out diagnosis

[  ] Other diagnosis ___________

[  ] Unable to determine



In addition, please specify:

Present on Admission (POA):  [  ] Yes             [X  ] No             [  ] 
Unable to determine



For continuity of documentation, please document condition throughout progress 
notes and discharge summary.  Thank You.



CLINICAL INDICATORS - SIGNS / SYMPTOMS / LABS

Laboratory 4/21  RBC 5.23, Hgb 15.3, Hct 48.4

Laboratory 4/25  RBC 3.51, Hgb 10.4, Hct 33.4

Laboratory 4/26  RBC 3.43, Hgb 9.9, Hct 32.4

Operative report p1 4/21  estimated blood loss 50 ml

PN p1 4/26  Ohaju stable acute blood loss anemia

Vital Signs BP: 04/22=99/62 04/24=99/63 04/2926=066/86



RISK FACTORS

Operative report p1 4/21  Perforated duodenal ulcer

Operative report p1 4/21  s/p Omental patch repair of perforated duodenal ulcer

PN p1 4/25 Dr  Hypokalemia

PN p1 4/25 Dr  Hypomagnesemia

PN p1 4/26 Dr  Acute hypophosphatemia



TREATMENTS

MAR 4/21  IVF NS 1L

MAR 4/26  Ferrous Sulfate 325 mg oral

Laboratory monitoring 4/25

PN p2 4/26  started on iron replacement therapy and Vitamin C





(This form is maintained as a part of the permanent medical record)

2015 [employer]  All Rights Reserved

[email or web address]    [phone]



MTDD

## 2020-11-30 ENCOUNTER — HOSPITAL ENCOUNTER (OUTPATIENT)
Dept: HOSPITAL 92 - LABBT | Age: 55
Discharge: HOME | End: 2020-11-30
Attending: INTERNAL MEDICINE
Payer: MEDICARE

## 2020-11-30 DIAGNOSIS — Z20.828: ICD-10-CM

## 2020-11-30 DIAGNOSIS — Z01.812: Primary | ICD-10-CM

## 2020-11-30 PROCEDURE — 87635 SARS-COV-2 COVID-19 AMP PRB: CPT

## 2020-11-30 PROCEDURE — U0003 INFECTIOUS AGENT DETECTION BY NUCLEIC ACID (DNA OR RNA); SEVERE ACUTE RESPIRATORY SYNDROME CORONAVIRUS 2 (SARS-COV-2) (CORONAVIRUS DISEASE [COVID-19]), AMPLIFIED PROBE TECHNIQUE, MAKING USE OF HIGH THROUGHPUT TECHNOLOGIES AS DESCRIBED BY CMS-2020-01-R: HCPCS
